# Patient Record
Sex: MALE | Race: WHITE | Employment: FULL TIME | URBAN - METROPOLITAN AREA
[De-identification: names, ages, dates, MRNs, and addresses within clinical notes are randomized per-mention and may not be internally consistent; named-entity substitution may affect disease eponyms.]

---

## 2021-08-30 ENCOUNTER — OFFICE VISIT (OUTPATIENT)
Dept: HEMATOLOGY | Age: 41
End: 2021-08-30

## 2021-08-30 ENCOUNTER — HOSPITAL ENCOUNTER (OUTPATIENT)
Dept: LAB | Age: 41
Discharge: HOME | End: 2021-08-30
Payer: COMMERCIAL

## 2021-08-30 VITALS
OXYGEN SATURATION: 98 % | TEMPERATURE: 98.4 F | WEIGHT: 276 LBS | HEART RATE: 85 BPM | DIASTOLIC BLOOD PRESSURE: 105 MMHG | BODY MASS INDEX: 36.58 KG/M2 | SYSTOLIC BLOOD PRESSURE: 161 MMHG | HEIGHT: 73 IN

## 2021-08-30 DIAGNOSIS — R74.8 ELEVATED LIVER ENZYMES: ICD-10-CM

## 2021-08-30 DIAGNOSIS — R74.8 ELEVATED LIVER ENZYMES: Primary | ICD-10-CM

## 2021-08-30 PROBLEM — Z86.79 S/P SCLEROTHERAPY OF VARICOSE VEINS: Status: ACTIVE | Noted: 2021-08-30

## 2021-08-30 PROBLEM — Z98.890 S/P SCLEROTHERAPY OF VARICOSE VEINS: Status: ACTIVE | Noted: 2021-08-30

## 2021-08-30 PROBLEM — I10 HYPERTENSION: Status: ACTIVE | Noted: 2021-08-30

## 2021-08-30 LAB
ALBUMIN SERPL-MCNC: 4 G/DL (ref 3.4–5)
ALBUMIN/GLOB SERPL: 0.9 {RATIO} (ref 0.8–1.7)
ALP SERPL-CCNC: 50 U/L (ref 45–117)
ALT SERPL-CCNC: 99 U/L (ref 16–61)
ANION GAP SERPL CALC-SCNC: 9 MMOL/L (ref 3–18)
AST SERPL-CCNC: 74 U/L (ref 10–38)
BASOPHILS # BLD: 0 K/UL (ref 0–0.1)
BASOPHILS NFR BLD: 1 % (ref 0–2)
BILIRUB DIRECT SERPL-MCNC: 0.1 MG/DL (ref 0–0.2)
BILIRUB SERPL-MCNC: 0.5 MG/DL (ref 0.2–1)
BUN SERPL-MCNC: 9 MG/DL (ref 7–18)
BUN/CREAT SERPL: 11 (ref 12–20)
CALCIUM SERPL-MCNC: 9 MG/DL (ref 8.5–10.1)
CHLORIDE SERPL-SCNC: 101 MMOL/L (ref 100–111)
CO2 SERPL-SCNC: 28 MMOL/L (ref 21–32)
CREAT SERPL-MCNC: 0.82 MG/DL (ref 0.6–1.3)
DIFFERENTIAL METHOD BLD: ABNORMAL
EOSINOPHIL # BLD: 0 K/UL (ref 0–0.4)
EOSINOPHIL NFR BLD: 0 % (ref 0–5)
ERYTHROCYTE [DISTWIDTH] IN BLOOD BY AUTOMATED COUNT: 13 % (ref 11.6–14.5)
FERRITIN SERPL-MCNC: 361 NG/ML (ref 8–388)
GLOBULIN SER CALC-MCNC: 4.3 G/DL (ref 2–4)
GLUCOSE SERPL-MCNC: 79 MG/DL (ref 74–99)
HCT VFR BLD AUTO: 47.6 % (ref 36–48)
HGB BLD-MCNC: 16.6 G/DL (ref 13–16)
INR PPP: 1 (ref 0.8–1.2)
IRON SATN MFR SERPL: 26 % (ref 20–50)
IRON SERPL-MCNC: 122 UG/DL (ref 50–175)
LYMPHOCYTES # BLD: 1.2 K/UL (ref 0.9–3.6)
LYMPHOCYTES NFR BLD: 17 % (ref 21–52)
MCH RBC QN AUTO: 31.4 PG (ref 24–34)
MCHC RBC AUTO-ENTMCNC: 34.9 G/DL (ref 31–37)
MCV RBC AUTO: 90.2 FL (ref 78–100)
MONOCYTES # BLD: 0.8 K/UL (ref 0.05–1.2)
MONOCYTES NFR BLD: 12 % (ref 3–10)
NEUTS SEG # BLD: 5 K/UL (ref 1.8–8)
NEUTS SEG NFR BLD: 70 % (ref 40–73)
PLATELET # BLD AUTO: 192 K/UL (ref 135–420)
PMV BLD AUTO: 12 FL (ref 9.2–11.8)
POTASSIUM SERPL-SCNC: 3.2 MMOL/L (ref 3.5–5.5)
PROT SERPL-MCNC: 8.3 G/DL (ref 6.4–8.2)
PROTHROMBIN TIME: 13.1 SEC (ref 11.5–15.2)
RBC # BLD AUTO: 5.28 M/UL (ref 4.35–5.65)
SODIUM SERPL-SCNC: 138 MMOL/L (ref 136–145)
TIBC SERPL-MCNC: 463 UG/DL (ref 250–450)
WBC # BLD AUTO: 7.1 K/UL (ref 4.6–13.2)

## 2021-08-30 PROCEDURE — 85610 PROTHROMBIN TIME: CPT

## 2021-08-30 PROCEDURE — 83540 ASSAY OF IRON: CPT

## 2021-08-30 PROCEDURE — 86706 HEP B SURFACE ANTIBODY: CPT

## 2021-08-30 PROCEDURE — 87340 HEPATITIS B SURFACE AG IA: CPT

## 2021-08-30 PROCEDURE — 82728 ASSAY OF FERRITIN: CPT

## 2021-08-30 PROCEDURE — 86803 HEPATITIS C AB TEST: CPT

## 2021-08-30 PROCEDURE — 86704 HEP B CORE ANTIBODY TOTAL: CPT

## 2021-08-30 PROCEDURE — 80048 BASIC METABOLIC PNL TOTAL CA: CPT

## 2021-08-30 PROCEDURE — 86038 ANTINUCLEAR ANTIBODIES: CPT

## 2021-08-30 PROCEDURE — 36415 COLL VENOUS BLD VENIPUNCTURE: CPT

## 2021-08-30 PROCEDURE — 85025 COMPLETE CBC W/AUTO DIFF WBC: CPT

## 2021-08-30 PROCEDURE — 82390 ASSAY OF CERULOPLASMIN: CPT

## 2021-08-30 PROCEDURE — 82103 ALPHA-1-ANTITRYPSIN TOTAL: CPT

## 2021-08-30 PROCEDURE — 83516 IMMUNOASSAY NONANTIBODY: CPT

## 2021-08-30 PROCEDURE — 99203 OFFICE O/P NEW LOW 30 MIN: CPT | Performed by: INTERNAL MEDICINE

## 2021-08-30 PROCEDURE — 80076 HEPATIC FUNCTION PANEL: CPT

## 2021-08-30 PROCEDURE — 86708 HEPATITIS A ANTIBODY: CPT

## 2021-08-30 RX ORDER — AMLODIPINE BESYLATE 5 MG/1
5 TABLET ORAL DAILY
COMMUNITY

## 2021-08-30 RX ORDER — LOVASTATIN 40 MG/1
40 TABLET ORAL DAILY
COMMUNITY
Start: 2021-07-06

## 2021-08-30 NOTE — PROGRESS NOTES
[facility] [address]      London Higgins MD, Karlee Jackson, Edward Grayson MD, MPH      Guido Tolliver, PA-DONNY Fuentes, [facility]-BC     Bianca Heck [facility]   Mian Paris, P-C    Shad Restrepo, [facility]       [facility] [address]    at [address], [facility] Leanne Figueredo  22.    [phone]    FAX: [address] [place] [address]    [address], [address]    Lynda Johnson, [address]    [phone]    FAX: [phone]         Patient Care Team:  Milagros Johnson MD as PCP - General (Family Medicine)      Problem List  Date Reviewed: 8/30/2021        Codes Class Noted    Elevated liver enzymes ICD-10-CM: R74.8  ICD-9-CM: 790.5  8/30/2021        Hypertension ICD-10-CM: I10  ICD-9-CM: 401.9  8/30/2021        S/P sclerotherapy of varicose veins ICD-10-CM: Z98.890, Z86.79  ICD-9-CM: V45.89  8/30/2021              The clinicians listed above have asked me to see Carlos Anne in consultation regarding elevated liver enzymes and its management. No medical records were available for review when the patient was here for the appointment. The patient is a 35 y.o.  male who was found to have elevated liver enzymes in 11/2020. Serologic evaluation for markers of chronic liver disease has either not been performed or the results are not available. Imaging of the liver was not performed. An assessment of liver fibrosis with biopsy, Fibroscan or elastography has not been performed. The patient does not have any symptoms which could be attributed to the liver disorder.     The patient is not experiencing the following symptoms which are commonly seen in this liver disorder:   fatigue,   pain in the right side over the liver,     The patient completes all daily activities without any functional limitations. ASSESSMENT AND PLAN:  Elevated liver enzymes  Persistent elevation in liver transaminases of unclear etiology at this time. ALP is normal.  Liver function is normal.  The platelet count is normal.      Serologic testing for causes of chronic liver disease was ordered. All was negative     The most likely causes for the liver chemistry abnormalities were discussed with the patient and include alcohol induced fatty liver     The need to perform an assessment of liver fibrosis was discussed with the patient. The Fibroscan can assess liver fibrosis and determine if a patient has advanced fibrosis or cirrhosis without the need for liver biopsy. This will be performed at the next office visit. If the Fibroscan suggests advanced fibrosis then a liver biopsy should be considered. The Fibroscan can be repeated annually or as often as clinically indicated to assess for fibrosis progression and/or regression. If the Fibroscan suggests there is none or minimal liver injury the patient will be monitored at 6 month intervals. Will perform laboratory testing to monitor liver function and degree of liver injury. This included BMP, hepatic panel, CBC with platelet count, INR. Will perform imaging of the liver with ultrasound. Screening for Hepatocellular Carcinoma  HCC screening is not necessary if the patient has no evidence of cirrhosis. Treatment of other medical problems in patients with chronic liver disease  There are no contraindications for the patient to take most medications that are necessary for treatment of other medical issues. Counseling for alcohol in patients with chronic liver disease  The patient was counseled regarding alcohol consumption and the effect of alcohol on chronic liver disease. The patient has continued to consume alcohol daily.       The patient consumes too much alcohol and is at risk to develop alcohol induced liver injury. It was recommended that all alcohol consumption be stopped and the patient be abstinent from alcohol  for at least 3 months. If the patient cannot stop consuming alcohol then there is an alcohol abuse disorder and the patient should consider entering alcohol counseling and/or attend AA. Vaccinations   Vaccination for viral hepatitis A and B is recommended since the patient has no serologic evidence of previous exposure or vaccination with immunity. Routine vaccinations against other bacterial and viral agents can be performed as indicated. Annual flu vaccination should be administered if indicated. ALLERGIES  Not on File    MEDICATIONS  Current Outpatient Medications   Medication Sig    amLODIPine (NORVASC) 5 mg tablet Take 5 mg by mouth daily.  TRIAMTERENE-HYDROCHLOROTHIAZID PO Take  by mouth.  lovastatin (MEVACOR) 40 mg tablet Take 40 mg by mouth daily. No current facility-administered medications for this visit. SYSTEM REVIEW NOT RELATED TO LIVER DISEASE OR REVIEWED ABOVE:  Constitution systems: Negative for fever, chills, weight gain, weight loss. Eyes: Negative for visual changes. ENT: Negative for sore throat, painful swallowing. Respiratory: Negative for cough, hemoptysis, SOB. Cardiology: Negative for chest pain, palpitations. GI:  Negative for constipation or diarrhea. : Negative for urinary frequency, dysuria, hematuria, nocturia. Skin: Negative for rash. Hematology: Negative for easy bruising, blood clots. Musculo-skelatal: Negative for back pain, muscle pain, weakness. Neurologic: Negative for headaches, dizziness, vertigo, memory problems not related to HE. Psychology: Negative for anxiety, depression. FAMILY HISTORY:  The father Has/had the following following chronic disease(s): Dementia. The mother Has/had the following chronic disease(s): None. There is no family history of liver disease.         SOCIAL HISTORY:  The patient has never been . The patient has 5 children,   The patient currently smokes 1-2 pack of tobacco daily. The patient consumes 6 alcoholic beverages per day   The patient used to work as . PHYSICAL EXAMINATION:  Visit Vitals  BP (!) 161/105   Pulse 85   Temp 98.4 °F (36.9 °C) (Tympanic)   Ht 6' 1\" (1.854 m)   Wt 276 lb (125.2 kg)   SpO2 98%   BMI 36.41 kg/m²     General: No acute distress. Eyes: Sclera anicteric. ENT: No oral lesions. Thyroid normal.  Nodes: No adenopathy. Skin: No spider angiomata. No jaundice. No palmar erythema. Respiratory: Lungs clear to auscultation. Cardiovascular: Regular heart rate. No murmurs. No JVD. Abdomen: Soft non-tender. Liver size normal to percussion/palpation. Spleen not palpable. No obvious ascites. Extremities: No edema. No muscle wasting. No gross arthritic changes. Neurologic: Alert and oriented. Cranial nerves grossly intact. No asterixis.     LABORATORY STUDIES:  Liver [place] of [address] [place] [address] Units 8/30/2021   WBC 4.6 - 13.2 K/uL 7.1   ANC 1.8 - 8.0 K/UL 5.0   HGB 13.0 - 16.0 g/dL 16.6 (H)    - 420 K/uL 192   INR 0.8 - 1.2   1.0   AST 10 - 38 U/L 74 (H)   ALT 16 - 61 U/L 99 (H)   Alk Phos 45 - 117 U/L 50   Bili, Total 0.2 - 1.0 MG/DL 0.5   Bili, Direct 0.0 - 0.2 MG/DL 0.1   Albumin 3.4 - 5.0 g/dL 4.0   BUN 7.0 - 18 MG/DL 9   Creat 0.6 - 1.3 MG/DL 0.82   Na 136 - 145 mmol/L 138   K 3.5 - 5.5 mmol/L 3.2 (L)   Cl 100 - 111 mmol/L 101   CO2 21 - 32 mmol/L 28   Glucose 74 - 99 mg/dL 79     SEROLOGIES:  Serologies Latest Ref Rng & Units 8/30/2021   Hep A Ab, Total Negative   Negative   Hep B Surface Ag <1.00 Index <0.10   Hep B Surface Ag Interp NEG   Negative   Hep B Core Ab, Total Negative   Negative   Hep B Surface Ab >10.0 mIU/mL <3.10 (L)   Hep B Surface Ab Interp POS   Negative (A)   Hep C Ab 0.0 - 0.9 s/co ratio <0.1   Ferritin 8 - 388 NG/   Iron % Saturation 20 - 50 % 26 ROSA, IFA  Negative   ASMCA 0 - 19 Units 6   Ceruloplasmin 16.0 - 31.0 mg/dL 19.1   Alpha-1 antitrypsin level 101 - 187 mg/dL 161     LIVER HISTOLOGY:  Not available or performed    ENDOSCOPIC PROCEDURES:  Not available or performed    RADIOLOGY:  Not available or performed    OTHER TESTING:  Not available or performed    FOLLOW-UP:  All of the issues listed above in the Assessment and Plan were discussed with the patient. All questions were answered. The patient expressed a clear understanding of the above. [facility] [place] [address] in 4 weeks for Fibroscan to review all data and determine the treatment plan.       [place]  [address] [address]  [address] [place] [address], [address] [place] [address] [place], [address] [facility] [address]  12 [facility]

## 2021-08-31 LAB
A1AT SERPL-MCNC: 161 MG/DL (ref 101–187)
ANA TITR SER IF: NEGATIVE {TITER}
CERULOPLASMIN SERPL-MCNC: 19.1 MG/DL (ref 16–31)
HAV AB SER QL IA: NEGATIVE
HBV CORE AB SERPL QL IA: NEGATIVE
HBV SURFACE AB SER QL IA: NEGATIVE
HBV SURFACE AB SERPL IA-ACNC: <3.1 MIU/ML
HBV SURFACE AG SER QL: <0.1 INDEX
HBV SURFACE AG SER QL: NEGATIVE
HEP BS AB COMMENT,HBSAC: ABNORMAL

## 2021-09-01 LAB
ACTIN IGG SERPL-ACNC: 6 UNITS (ref 0–19)
HCV AB S/CO SERPL IA: <0.1 S/CO RATIO (ref 0–0.9)
HCV AB SERPL QL IA: NORMAL

## 2022-03-19 PROBLEM — R74.8 ELEVATED LIVER ENZYMES: Status: ACTIVE | Noted: 2021-08-30

## 2022-03-19 PROBLEM — I10 HYPERTENSION: Status: ACTIVE | Noted: 2021-08-30

## 2022-03-20 PROBLEM — Z86.79 S/P SCLEROTHERAPY OF VARICOSE VEINS: Status: ACTIVE | Noted: 2021-08-30

## 2022-03-20 PROBLEM — Z98.890 S/P SCLEROTHERAPY OF VARICOSE VEINS: Status: ACTIVE | Noted: 2021-08-30
